# Patient Record
Sex: FEMALE | Race: WHITE | URBAN - METROPOLITAN AREA
[De-identification: names, ages, dates, MRNs, and addresses within clinical notes are randomized per-mention and may not be internally consistent; named-entity substitution may affect disease eponyms.]

---

## 2020-12-29 DIAGNOSIS — Z11.59 ENCOUNTER FOR SCREENING FOR OTHER VIRAL DISEASES: ICD-10-CM

## 2021-01-07 ASSESSMENT — MIFFLIN-ST. JEOR: SCORE: 1208.03

## 2021-01-10 DIAGNOSIS — Z11.59 ENCOUNTER FOR SCREENING FOR OTHER VIRAL DISEASES: ICD-10-CM

## 2021-01-10 LAB
SARS-COV-2 RNA RESP QL NAA+PROBE: NORMAL
SPECIMEN SOURCE: NORMAL

## 2021-01-10 PROCEDURE — U0003 INFECTIOUS AGENT DETECTION BY NUCLEIC ACID (DNA OR RNA); SEVERE ACUTE RESPIRATORY SYNDROME CORONAVIRUS 2 (SARS-COV-2) (CORONAVIRUS DISEASE [COVID-19]), AMPLIFIED PROBE TECHNIQUE, MAKING USE OF HIGH THROUGHPUT TECHNOLOGIES AS DESCRIBED BY CMS-2020-01-R: HCPCS | Performed by: PLASTIC SURGERY

## 2021-01-10 PROCEDURE — U0005 INFEC AGEN DETEC AMPLI PROBE: HCPCS | Performed by: PLASTIC SURGERY

## 2021-01-11 LAB
LABORATORY COMMENT REPORT: NORMAL
SARS-COV-2 RNA RESP QL NAA+PROBE: NEGATIVE
SPECIMEN SOURCE: NORMAL

## 2021-01-13 ENCOUNTER — ANESTHESIA EVENT (OUTPATIENT)
Dept: SURGERY | Facility: AMBULATORY SURGERY CENTER | Age: 31
End: 2021-01-13

## 2021-01-14 ENCOUNTER — ANESTHESIA (OUTPATIENT)
Dept: SURGERY | Facility: AMBULATORY SURGERY CENTER | Age: 31
End: 2021-01-14

## 2021-01-14 ENCOUNTER — HOSPITAL ENCOUNTER (OUTPATIENT)
Facility: AMBULATORY SURGERY CENTER | Age: 31
Discharge: HOME | End: 2021-01-14
Attending: PLASTIC SURGERY | Admitting: PLASTIC SURGERY

## 2021-01-14 VITALS
TEMPERATURE: 98.3 F | RESPIRATION RATE: 19 BRPM | OXYGEN SATURATION: 100 % | HEIGHT: 64 IN | HEART RATE: 70 BPM | WEIGHT: 112 LBS | BODY MASS INDEX: 19.12 KG/M2 | DIASTOLIC BLOOD PRESSURE: 82 MMHG | SYSTOLIC BLOOD PRESSURE: 106 MMHG

## 2021-01-14 DIAGNOSIS — R52 PAIN: Primary | ICD-10-CM

## 2021-01-14 DIAGNOSIS — B99.9 INFECTION: ICD-10-CM

## 2021-01-14 LAB — HCG UR QL: NEGATIVE

## 2021-01-14 PROCEDURE — L8600 IMPLANT BREAST SILICONE/EQ: HCPCS

## 2021-01-14 PROCEDURE — 360N000089 HC SURGERY LEVEL COSMETIC 120 MIN

## 2021-01-14 PROCEDURE — G8907 PT DOC NO EVENTS ON DISCHARG: HCPCS

## 2021-01-14 PROCEDURE — 81025 URINE PREGNANCY TEST: CPT | Performed by: ANESTHESIOLOGY

## 2021-01-14 PROCEDURE — G8916 PT W IV AB GIVEN ON TIME: HCPCS

## 2021-01-14 DEVICE — IMPLANTABLE DEVICE: Type: IMPLANTABLE DEVICE | Site: BREAST | Status: FUNCTIONAL

## 2021-01-14 RX ORDER — DIAZEPAM 10 MG
10 TABLET ORAL EVERY 12 HOURS PRN
Status: DISCONTINUED | OUTPATIENT
Start: 2021-01-14 | End: 2021-01-15 | Stop reason: HOSPADM

## 2021-01-14 RX ORDER — FENTANYL CITRATE 50 UG/ML
INJECTION, SOLUTION INTRAMUSCULAR; INTRAVENOUS PRN
Status: DISCONTINUED | OUTPATIENT
Start: 2021-01-14 | End: 2021-01-14

## 2021-01-14 RX ORDER — DEXAMETHASONE SODIUM PHOSPHATE 4 MG/ML
4 INJECTION, SOLUTION INTRA-ARTICULAR; INTRALESIONAL; INTRAMUSCULAR; INTRAVENOUS; SOFT TISSUE EVERY 10 MIN PRN
Status: DISCONTINUED | OUTPATIENT
Start: 2021-01-14 | End: 2021-01-15 | Stop reason: HOSPADM

## 2021-01-14 RX ORDER — FENTANYL CITRATE 50 UG/ML
25-50 INJECTION, SOLUTION INTRAMUSCULAR; INTRAVENOUS
Status: DISCONTINUED | OUTPATIENT
Start: 2021-01-14 | End: 2021-01-15 | Stop reason: HOSPADM

## 2021-01-14 RX ORDER — ACETAMINOPHEN 325 MG/1
975 TABLET ORAL ONCE
Status: COMPLETED | OUTPATIENT
Start: 2021-01-14 | End: 2021-01-14

## 2021-01-14 RX ORDER — KETOROLAC TROMETHAMINE 30 MG/ML
30 INJECTION, SOLUTION INTRAMUSCULAR; INTRAVENOUS EVERY 6 HOURS PRN
Status: DISCONTINUED | OUTPATIENT
Start: 2021-01-14 | End: 2021-01-15 | Stop reason: HOSPADM

## 2021-01-14 RX ORDER — HYDROXYZINE HYDROCHLORIDE 25 MG/1
25 TABLET, FILM COATED ORAL
Status: DISCONTINUED | OUTPATIENT
Start: 2021-01-14 | End: 2021-01-15 | Stop reason: HOSPADM

## 2021-01-14 RX ORDER — NALOXONE HYDROCHLORIDE 0.4 MG/ML
0.4 INJECTION, SOLUTION INTRAMUSCULAR; INTRAVENOUS; SUBCUTANEOUS
Status: DISCONTINUED | OUTPATIENT
Start: 2021-01-14 | End: 2021-01-15 | Stop reason: HOSPADM

## 2021-01-14 RX ORDER — PROPOFOL 10 MG/ML
INJECTION, EMULSION INTRAVENOUS PRN
Status: DISCONTINUED | OUTPATIENT
Start: 2021-01-14 | End: 2021-01-14

## 2021-01-14 RX ORDER — ONDANSETRON 4 MG/1
4 TABLET, ORALLY DISINTEGRATING ORAL EVERY 30 MIN PRN
Status: DISCONTINUED | OUTPATIENT
Start: 2021-01-14 | End: 2021-01-15 | Stop reason: HOSPADM

## 2021-01-14 RX ORDER — ONDANSETRON 2 MG/ML
INJECTION INTRAMUSCULAR; INTRAVENOUS PRN
Status: DISCONTINUED | OUTPATIENT
Start: 2021-01-14 | End: 2021-01-14

## 2021-01-14 RX ORDER — SODIUM CHLORIDE, SODIUM LACTATE, POTASSIUM CHLORIDE, CALCIUM CHLORIDE 600; 310; 30; 20 MG/100ML; MG/100ML; MG/100ML; MG/100ML
INJECTION, SOLUTION INTRAVENOUS CONTINUOUS
Status: DISCONTINUED | OUTPATIENT
Start: 2021-01-14 | End: 2021-01-15 | Stop reason: HOSPADM

## 2021-01-14 RX ORDER — MEPERIDINE HYDROCHLORIDE 25 MG/ML
12.5 INJECTION INTRAMUSCULAR; INTRAVENOUS; SUBCUTANEOUS
Status: DISCONTINUED | OUTPATIENT
Start: 2021-01-14 | End: 2021-01-15 | Stop reason: HOSPADM

## 2021-01-14 RX ORDER — CEFAZOLIN SODIUM 2 G/100ML
2 INJECTION, SOLUTION INTRAVENOUS
Status: COMPLETED | OUTPATIENT
Start: 2021-01-14 | End: 2021-01-14

## 2021-01-14 RX ORDER — ONDANSETRON 4 MG/1
4 TABLET, ORALLY DISINTEGRATING ORAL
Status: DISCONTINUED | OUTPATIENT
Start: 2021-01-14 | End: 2021-01-15 | Stop reason: HOSPADM

## 2021-01-14 RX ORDER — ALBUTEROL SULFATE 0.83 MG/ML
2.5 SOLUTION RESPIRATORY (INHALATION) EVERY 4 HOURS PRN
Status: DISCONTINUED | OUTPATIENT
Start: 2021-01-14 | End: 2021-01-15 | Stop reason: HOSPADM

## 2021-01-14 RX ORDER — ONDANSETRON 2 MG/ML
4 INJECTION INTRAMUSCULAR; INTRAVENOUS EVERY 30 MIN PRN
Status: DISCONTINUED | OUTPATIENT
Start: 2021-01-14 | End: 2021-01-15 | Stop reason: HOSPADM

## 2021-01-14 RX ORDER — HYDROXYZINE PAMOATE 25 MG/1
25 CAPSULE ORAL EVERY 6 HOURS PRN
Qty: 30 CAPSULE | Refills: 0
Start: 2021-01-14

## 2021-01-14 RX ORDER — OXYCODONE HYDROCHLORIDE 5 MG/1
5-10 TABLET ORAL EVERY 4 HOURS PRN
Status: DISCONTINUED | OUTPATIENT
Start: 2021-01-14 | End: 2021-01-15 | Stop reason: HOSPADM

## 2021-01-14 RX ORDER — ONDANSETRON 4 MG/1
8 TABLET, ORALLY DISINTEGRATING ORAL EVERY 6 HOURS PRN
Status: DISCONTINUED | OUTPATIENT
Start: 2021-01-14 | End: 2021-01-15 | Stop reason: HOSPADM

## 2021-01-14 RX ORDER — DEXAMETHASONE SODIUM PHOSPHATE 4 MG/ML
10 INJECTION, SOLUTION INTRA-ARTICULAR; INTRALESIONAL; INTRAMUSCULAR; INTRAVENOUS; SOFT TISSUE
Status: COMPLETED | OUTPATIENT
Start: 2021-01-14 | End: 2021-01-14

## 2021-01-14 RX ORDER — PROPOFOL 10 MG/ML
INJECTION, EMULSION INTRAVENOUS CONTINUOUS PRN
Status: DISCONTINUED | OUTPATIENT
Start: 2021-01-14 | End: 2021-01-14

## 2021-01-14 RX ORDER — LIDOCAINE HYDROCHLORIDE 20 MG/ML
INJECTION, SOLUTION INFILTRATION; PERINEURAL PRN
Status: DISCONTINUED | OUTPATIENT
Start: 2021-01-14 | End: 2021-01-14

## 2021-01-14 RX ORDER — OXYCODONE AND ACETAMINOPHEN 5; 325 MG/1; MG/1
2 TABLET ORAL
Status: DISCONTINUED | OUTPATIENT
Start: 2021-01-14 | End: 2021-01-15 | Stop reason: HOSPADM

## 2021-01-14 RX ORDER — LABETALOL HYDROCHLORIDE 5 MG/ML
10 INJECTION, SOLUTION INTRAVENOUS
Status: DISCONTINUED | OUTPATIENT
Start: 2021-01-14 | End: 2021-01-15 | Stop reason: HOSPADM

## 2021-01-14 RX ORDER — CEPHALEXIN 500 MG/1
500 CAPSULE ORAL 2 TIMES DAILY
Qty: 28 CAPSULE | Refills: 0
Start: 2021-01-14 | End: 2021-01-21

## 2021-01-14 RX ORDER — LIDOCAINE 40 MG/G
CREAM TOPICAL
Status: DISCONTINUED | OUTPATIENT
Start: 2021-01-14 | End: 2021-01-15 | Stop reason: HOSPADM

## 2021-01-14 RX ORDER — BUPIVACAINE HYDROCHLORIDE AND EPINEPHRINE 2.5; 5 MG/ML; UG/ML
INJECTION, SOLUTION INFILTRATION; PERINEURAL PRN
Status: DISCONTINUED | OUTPATIENT
Start: 2021-01-14 | End: 2021-01-14 | Stop reason: HOSPADM

## 2021-01-14 RX ORDER — OXYCODONE AND ACETAMINOPHEN 5; 325 MG/1; MG/1
1-2 TABLET ORAL EVERY 4 HOURS PRN
Qty: 20 TABLET | Refills: 0
Start: 2021-01-14

## 2021-01-14 RX ORDER — NALOXONE HYDROCHLORIDE 0.4 MG/ML
0.2 INJECTION, SOLUTION INTRAMUSCULAR; INTRAVENOUS; SUBCUTANEOUS
Status: DISCONTINUED | OUTPATIENT
Start: 2021-01-14 | End: 2021-01-15 | Stop reason: HOSPADM

## 2021-01-14 RX ADMIN — CEFAZOLIN SODIUM 2 G: 2 INJECTION, SOLUTION INTRAVENOUS at 10:22

## 2021-01-14 RX ADMIN — ONDANSETRON 4 MG: 2 INJECTION INTRAMUSCULAR; INTRAVENOUS at 10:37

## 2021-01-14 RX ADMIN — DEXAMETHASONE SODIUM PHOSPHATE 10 MG: 4 INJECTION, SOLUTION INTRA-ARTICULAR; INTRALESIONAL; INTRAMUSCULAR; INTRAVENOUS; SOFT TISSUE at 10:37

## 2021-01-14 RX ADMIN — LIDOCAINE HYDROCHLORIDE 60 MG: 20 INJECTION, SOLUTION INFILTRATION; PERINEURAL at 10:14

## 2021-01-14 RX ADMIN — PROPOFOL 200 MCG/KG/MIN: 10 INJECTION, EMULSION INTRAVENOUS at 10:15

## 2021-01-14 RX ADMIN — Medication 20 MG: at 10:54

## 2021-01-14 RX ADMIN — Medication 20 MG: at 10:34

## 2021-01-14 RX ADMIN — FENTANYL CITRATE 50 MCG: 50 INJECTION, SOLUTION INTRAMUSCULAR; INTRAVENOUS at 10:08

## 2021-01-14 RX ADMIN — SODIUM CHLORIDE, SODIUM LACTATE, POTASSIUM CHLORIDE, CALCIUM CHLORIDE: 600; 310; 30; 20 INJECTION, SOLUTION INTRAVENOUS at 09:43

## 2021-01-14 RX ADMIN — FENTANYL CITRATE 50 MCG: 50 INJECTION, SOLUTION INTRAMUSCULAR; INTRAVENOUS at 10:33

## 2021-01-14 RX ADMIN — PROPOFOL 100 MG: 10 INJECTION, EMULSION INTRAVENOUS at 10:32

## 2021-01-14 RX ADMIN — FENTANYL CITRATE 50 MCG: 50 INJECTION, SOLUTION INTRAMUSCULAR; INTRAVENOUS at 10:21

## 2021-01-14 RX ADMIN — PROPOFOL 200 MG: 10 INJECTION, EMULSION INTRAVENOUS at 10:14

## 2021-01-14 RX ADMIN — ACETAMINOPHEN 975 MG: 325 TABLET ORAL at 09:34

## 2021-01-14 ASSESSMENT — LIFESTYLE VARIABLES: TOBACCO_USE: 1

## 2021-01-14 NOTE — OP NOTE
Procedure Date: 01/14/2021      PREOPERATIVE DIAGNOSES:  Bilateral hypomastia.      POSTOPERATIVE DIAGNOSES:  Bilateral hypomastia.      PROCEDURES PERFORMED:  Bilateral augmentation mammoplasty through an inframammary fold incision with implant in a submuscular position.      IMPLANTS:   1.  Left breast implant:  Sientra HSC smooth, round, high profile 415 mL cohesive gel breast implant, reference number [id number], serial number [id number].      2.  Right breast implant:  Sientra 415 mL HSC smooth, round, high profile cohesive gel breast implant, reference number [id number], serial number is [id number].      SURGEON:  Guillermo Ramirez MD      ANESTHESIA:  General, using a laryngeal masked airway.      INDICATIONS FOR PROCEDURE:  The patient is a very pleasant and attractive 30-year-old female who has been to see us on several occasions for consultation regarding breast augmentation.  She understands that breast implants are not considered a lifetime medical device and she likely will have to have them replaced within her lifetime.  The current generation of Sientra HSC cohesive gel breast implants do carry a lifetime warranty.  The patient understands there is a phenomenon known as silent rupture, where the implant may fail and neither the surgeon nor patient are aware of this.  I recommend MRI surveillance for silent rupture at 8-10 years postoperatively.  The patient understands there is a phenomenon known as capsular contracture, where the body may form an aggressive scar capsule around the breast implant.  The rate of capsular contracture in my practice is between 1% and 2% when implant is placed in a submuscular position through an inframammary fold incision, as would be the case for this patient.  The patient was told there are 4 maneuvers I utilize to diminish her chances of capsular contracture.  The first is submuscular breast implant placement.  Submuscular breast implant placement confers a 3- to  4-fold decrease in rate of capsular contracture compared with subglandular breast augmentation.  Subglandular breast augmentation carries a capsular contracture rate in the 12% to 16% range and this can be lowered to the 3% to 4% range by placing the implant in a submuscular position.  Use of an inframammary fold incision for placement of the breast implant has been shown to confer a 10-fold reduction rate of capsular contracture compared with the use of a periareolar incision.  This was shown in clinical studies by Tucker Valdes MD from [facility].  In his study, Dr. Valdes showed that the rate of capsular contracture through an inframammary fold incision was 0.59%, whereas his capsular contracture rate was 9.5% when a periareolar incision was used.  Use of breast pocket irrigations consisting of Ancef, gentamicin and bacitracin has been shown to decrease the rate of capsular contracture in clinical studies performed by Tenzin Angel MD from the [facility].  Last maneuver, I utilize is the use of a Ferrer funnel for implant placement.  The Ferrer funnel allows for a no-touch technique, which theoretically should decrease the chance of developing a biofilm and therefore capsular contracture.  The patient understands that she should tell her mammographer that she has breast implants when age appropriate.  Special mammographic views known as Jamia views are used to best visualize the breasts following augmentation mammoplasty.      The patient was told there is a condition known as breast implant-associated lymphoma.  Approximately 400-500 cases of breast implant-associated lymphoma have been reported in the worldwide literature to date and all have been shown to occur in patients where the surgeon has chosen to use a textured implant.  In my practice dating to 1998, I have used only smooth implants in my practice.  No cases of breast implant-associated lymphoma have  occurred with use of smooth breast implants.  The patient understands that her incision will be in the inframammary fold.  She understands that her implant will be placed in a submuscular position.  She understands the risks of the operation include first and foremost the chance of capsular contracture, followed by implant malposition, asymmetry, hypertrophic scarring, and possible dissatisfaction with cosmetic outcome.  Complications such as bleeding and infection are extremely rare in my practice.  I have had 1 single postoperative bleed and 2 infections in my practice dating to 1994.  The patient has been given a chance to ask questions and all were answered.  The patient provides her informed consent for the procedure.      DESCRIPTION OF PROCEDURE AND FINDINGS:  In the preoperative holding area, the inframammary fold was marked and consent was obtained.  This consent is in addition to our in-office consent that she signed at her preoperative evaluation.  The patient was taken to the operating room, placed in supine position on the operating room table.  A successful general anesthetic was then induced using a laryngeal mask airway.  The patient received 2 grams of Ancef and 10 mg of Decadron intravenously prior to commencing with surgery.  The patient's chest was then prepped and draped in routine sterile fashion.  Nipple areolar complexes were covered with 3M Tegaderm dressings.  A timeout was called at 11:29 a.m.  Incision was made at 11:30 a.m.  A 4 cm incision was made in the right inframammary fold and through this incision dissection was carried down to the pectoralis major muscle.  I then dissected several centimeters above the inframammary fold on the clavipectoral fascia.  Then, over a rib and utilizing a Arnaldo retractor, I established a submuscular plane.  The muscle was partially released from 3 o'clock to 6 o'clock on the right side.  Intercostal perforating vessels were cauterized using an insulated  bayonet style DeBakey type monopolar forceps as I came upon them.  Finger dissection was used laterally to avoid any traction of the fourth intercostal nerve.  Superiorly, dissection was made between the pectoralis major and pectoralis minor muscles using a uterine sound in gentle sweeping fashion.  A sizer was placed and filled with air, which gave nice shape to her breast and also identified areas where further attenuation of the medial fibers of pectoralis major was needed and this was accomplished surgically.  The right breast pocket was then injected with 0.25% Marcaine with Kenalog for postoperative analgesia and for the anti-inflammatory properties of the steroids.  Right breast pocket was then irrigated with solution consisting of 1 gram of Ancef, 80 mg of gentamicin, and bacitracin in normal saline.  This was done in accordance with studies by Tenzin Angel MD, from the [facility] as a clinically proven means of diminishing the chance of capsular contracture.      Attention was then directed to the left side, where the identical operation was performed.  On the left side, the muscle was partially released from 6 o'clock to 9 o'clock.  Again, a sizer was placed and filled with air, which gave nice shape to her breast.  This also identified areas were further attenuation of the medial fibers of pectoralis major was needed and this was accomplished surgically.  I then injected the dissection pocket with 0.25% Marcaine and Kenalog for postoperative analgesia and for the anti-inflammatory properties of the steroids.  The Ancef, gentamicin and bacitracin solution was then irrigated into left breast pocket as well.        Sientra 415 mL HSC smooth, round, high profile, cohesive gel breast implants were chosen.  Their reference numbers and serial numbers can be found at the beginning of this operative report.  They were opened on the back table.  The Ancef, gentamicin and bacitracin solution  was then irrigated into the breast pockets.  At this point, all operative personnel changed their gloves.  The implants were soaked in the Ancef, gentamicin and bacitracin solution.  A Ferrer funnel was opened.  I lubricated the funnel with the Ancef, gentamicin and bacitracin solution.  The implant was then transferred from its sterile container to the Ferrer funnel on the back table.  The implant was then inserted into the right breast pocket utilizing a no-touch technique.  Closure consisted of 2-0 and 3-0 Vicryl sutures in the deep muscular layer.  The 3-0 sutures were used laterally, the 2-0 sutures used medially.  The deep subcutaneous layer was closed using 4-0 PDS sutures in buried interrupted fashion.  Deep dermal sutures were then placed using 4-0 Monocryl suture in buried and interrupted fashion.  The skin was then run using 4-0 Prolene suture in running and buried continuous intracuticular fashion, 6-0 Prolene sutures were placed at each end of the suture for exact coaptation of the skin.      Attention was then directed to the left side, where the identical operation was performed.  Once again, all operative personnel changed their gloves.  The Ferrer funnel was then lubricated using the antibiotic solution.  The Sientra implant was then transferred from its sterile container to the Ferrer funnel.  The Ferrer funnel was then utilized to place the implant utilizing a no-touch technique.  Closure was identical from left to right.  Dressing consisted of Mastisol and 3M half-inch Steri-Strips over the inframammary fold incisions followed by a surgical bra.  It should be noted that I injected lateral to the breast and inferior to the breast with 0.25% Marcaine with 1:200,000 epinephrine.      ESTIMATED BLOOD LOSS:  Minimal.      COMPLICATIONS:  None.      CONDITION:  Stable to postanesthesia recovery.      OPERATIVE TIMES:  In-room time:  10:11 a.m.  Timeout called:  10:29 a.m.  Start of operation:  10:30  a.m.  End of procedure:  11:48 a.m.  Out of room time:  11:56 a.m.      ACTUAL PAID SURGICAL OPERATIVE TIME:  2 hours, which equals 2 hours of paid surgical operative time plus 30 minutes of anesthesia/OR time.  Actual surgical operative time was 1 hour and 28 minutes, or 32 minutes ahead of paid surgical operative time.         AMILCAR DELGDAO MD             D: 2021   T: 2021   MT: OWEN      Name:     RHYS DORADO   MRN:      [id number]        Account:        [id number]   :      1990           Procedure Date: 2021      Document: [id number]

## 2021-01-14 NOTE — ANESTHESIA PROCEDURE NOTES
Airway    Patient location during procedure: OR    Staff -   CRNA: Juaquin Bullock APRN CRNA  Performed By: CRNA    Consent for Airway   Urgency: elective    Indications and Patient Condition  Indications for airway management: omkar-procedural  Induction type:intravenousMask difficulty assessment: 1 - vent by mask    Final Airway Details  Final airway type: supraglottic airway    Endotracheal Airway Details   Secured with: plastic tape    Post intubation assessment   Placement verified by: capnometry, equal breath sounds and chest rise   Number of attempts at approach: 1  Number of other approaches attempted: 0  Secured with:plastic tape  Ease of procedure: easy  Dentition: Intact and Unchanged

## 2021-01-14 NOTE — ANESTHESIA POSTPROCEDURE EVALUATION
Anesthesia POST Procedure Evaluation    Patient: Marita Dwyer   MRN:     [id number] Gender:   female   Age:    30 year old :      1990        Preoperative Diagnosis: Encounter for cosmetic surgery [Z41.1]   Procedure(s):  Bilateral breast augmentation   Postop Comments: No value filed.     Anesthesia Type: General       Disposition: Outpatient   Postop Pain Control: Uneventful            Sign Out: Well controlled pain   PONV: No   Neuro/Psych: Uneventful            Sign Out: Acceptable/Baseline neuro status   Airway/Respiratory: Uneventful            Sign Out: AIRWAY IN SITU/Resp. Support   CV/Hemodynamics: Uneventful            Sign Out: Acceptable CV status   Other NRE: NONE   DID A NON-ROUTINE EVENT OCCUR? No         Last Anesthesia Record Vitals:  CRNA VITALS  2021 1129 - 2021 1229      2021             Pulse:  111    SpO2:  100 %    Resp Rate (observed):  9          Last PACU Vitals:  Vitals Value Taken Time   /69 21 1230   Temp 98.3  F (36.8  C) 21 1230   Pulse 67 21 1230   Resp 16 21 1230   SpO2 100 % 21 1230   Temp src     NIBP     Pulse     SpO2     Resp     Temp     Ht Rate     Temp 2           Electronically Signed By: Johnny Frost MD, 2021, 3:29 PM

## 2021-01-14 NOTE — ANESTHESIA CARE TRANSFER NOTE
Patient: Marita Dwyer    Procedure(s):  Bilateral breast augmentation    Diagnosis: Encounter for cosmetic surgery [Z41.1]  Diagnosis Additional Information: No value filed.    Anesthesia Type:   General     Note:  Airway :Face Mask  Patient transferred to:PACU  Handoff Report: Identifed the Patient, Identified the Reponsible Provider, Reviewed the pertinent medical history, Discussed the surgical course, Reviewed Intra-OP anesthesia mangement and issues during anesthesia, Set expectations for post-procedure period and Allowed opportunity for questions and acknowledgement of understanding      Vitals: (Last set prior to Anesthesia Care Transfer)    CRNA VITALS  1/14/2021 1129 - 1/14/2021 1217      1/14/2021             Pulse:  111    SpO2:  100 %    Resp Rate (observed):  9                Electronically Signed By: SANA Cook CRNA  January 14, 2021  12:17 PM

## 2021-01-14 NOTE — BRIEF OP NOTE
[facility] Brief Operative Note    Pre-operative diagnosis: Hypomastia   Post-operative diagnosis same   Procedure: Procedure(s):  Bilateral breast augmentation   Surgeon(s): Surgeon(s) and Role:     * Guillermo Ramirez MD - Primary   Estimated blood loss:  minimal    Specimens: none   Findings: none   Assist: none  Complications: none  Condition: extubated and stable to PAR    Guillermo Ramirez MD

## 2021-01-14 NOTE — OR NURSING
Removed umbilicus ring  after induction of anesthesia for cautery safety .  Sent with patient to PACU

## 2021-01-14 NOTE — DISCHARGE INSTRUCTIONS
[facility]   Adult Discharge Orders & Instructions     For 24 hours after surgery    1. Get plenty of rest.  A responsible adult must stay with you for at least 24 hours after you leave the hospital.   2. Do not drive or use heavy equipment.  If you have weakness or tingling, don't drive or use heavy equipment until this feeling goes away.  3. Do not drink alcohol.  4. Avoid strenuous or risky activities.  Ask for help when climbing stairs.   5. You may feel lightheaded.  IF so, sit for a few minutes before standing.  Have someone help you get up.   6. If you have nausea (feel sick to your stomach): Drink only clear liquids such as apple juice, ginger ale, broth or 7-Up.  Rest may also help.  Be sure to drink enough fluids.  Move to a regular diet as you feel able.  7. You may have a slight fever. Call the doctor if your fever is over 100 F (37.7 C) (taken under the tongue) or lasts longer than 24 hours.  8. You may have a dry mouth, a sore throat, muscle aches or trouble sleeping.  These should go away after 24 hours.  9. Do not make important or legal decisions.     Call your doctor for any of the followin.  Signs of infection (fever, growing tenderness at the surgery site, a large amount of drainage or bleeding, severe pain, foul-smelling drainage, redness, swelling).    2. It has been over 8 to 10 hours since surgery and you are still not able to urinate (pass water).    3.  Headache for over 24 hours.    4.  Numbness, tingling or weakness the day after surgery (if you had spinal anesthesia).                  5. Signs of Covid-19 infection (temperature over 100 degrees, shortness of breath, cough, loss of taste/smell, generalized body aches, persistent headache,                  chills, sore throat, nausea/vomiting/diarrhea).    To contact Dr Sy call:    [phone] - Day  [phone] - After hours pager

## 2021-01-14 NOTE — ANESTHESIA PREPROCEDURE EVALUATION
"Anesthesia Pre-Procedure Evaluation    Patient: Marita Dwyer   MRN:     [id number] Gender:   female   Age:    30 year old :      1990        Preoperative Diagnosis: Encounter for cosmetic surgery [Z41.1]   Procedure(s):  Bilateral breast augmentation     LABS:  CBC: No results found for: WBC, HGB, HCT, PLT  BMP: No results found for: NA, POTASSIUM, CHLORIDE, CO2, BUN, CR, GLC  COAGS: No results found for: PTT, INR, FIBR  POC: No results found for: BGM, HCG, HCGS  OTHER: No results found for: PH, LACT, A1C, ANTHONY, PHOS, MAG, ALBUMIN, PROTTOTAL, ALT, AST, GGT, ALKPHOS, BILITOTAL, BILIDIRECT, LIPASE, AMYLASE, YVONNE, TSH, T4, T3, CRP, SED     Preop Vitals    BP Readings from Last 3 Encounters:   21 113/70    Pulse Readings from Last 3 Encounters:   No data found for Pulse      Resp Readings from Last 3 Encounters:   21 16    SpO2 Readings from Last 3 Encounters:   21 98%      Temp Readings from Last 1 Encounters:   21 97.5  F (36.4  C) (Temporal)    Ht Readings from Last 1 Encounters:   21 1.626 m (5' 4\")      Wt Readings from Last 1 Encounters:   21 50.8 kg (112 lb)    Estimated body mass index is 19.22 kg/m  as calculated from the following:    Height as of this encounter: 1.626 m (5' 4\").    Weight as of this encounter: 50.8 kg (112 lb).     LDA:        History reviewed. No pertinent past medical history.   History reviewed. No pertinent surgical history.   No Known Allergies     Anesthesia Evaluation     . Pt has had prior anesthetic. Type: General and MAC    No history of anesthetic complications          ROS/MED HX    ENT/Pulmonary: Comment: Quit smoking 6 weeks ago.  She uses marijuana regularly, and used it at 5:30 AM today.    (+)tobacco use, Past use , . .    Neurologic:  - neg neurologic ROS     Cardiovascular:  - neg cardiovascular ROS       METS/Exercise Tolerance:     Hematologic:  - neg hematologic  ROS       Musculoskeletal:  - neg musculoskeletal ROS     "   GI/Hepatic:  - neg GI/hepatic ROS       Renal/Genitourinary:  - ROS Renal section negative       Endo:  - neg endo ROS       Psychiatric:  - neg psychiatric ROS       Infectious Disease:  - neg infectious disease ROS       Malignancy:      - no malignancy   Other:    - neg other ROS                     PHYSICAL EXAM:   Mental Status/Neuro: A/A/O   Airway: Facies: Feasible  Mallampati: I  Mouth/Opening: Full  TM distance: > 6 cm  Neck ROM: Full   Respiratory: Auscultation: CTAB     Resp. Rate: Normal     Resp. Effort: Normal      CV: Rhythm: Regular  Rate: Age appropriate  Heart: Normal Sounds  Edema: None   Comments:      Dental: Normal Dentition                Assessment:   ASA SCORE: 1    H&P: History and physical reviewed and following examination; no interval change.   Smoking Status:  Non-Smoker/Unknown   NPO Status: NPO Appropriate     Plan:   Anes. Type:  General   Pre-Medication: None   Induction:  IV (Standard)   Airway: LMA   Access/Monitoring: PIV   Maintenance: TIVA     Postop Plan:   Postop Pain: Opioids  Postop Sedation/Airway: Not planned  Disposition: Outpatient     PONV Management:   Adult Risk Factors: Female, Non-Smoker, Postop Opioids   Prevention: Ondansetron, Dexamethasone, No Volatiles     CONSENT: Direct conversation   Plan and risks discussed with: Patient   Blood Products: Consent Deferred (Minimal Blood Loss)                   Johnny Frost MD

## (undated) DEVICE — GLOVE PROTEXIS W/NEU-THERA 6.5  2D73TE65

## (undated) DEVICE — PACK MINOR SBA15MIFSE

## (undated) DEVICE — GLOVE PROTEXIS BLUE W/NEU-THERA 7.0  2D73EB70

## (undated) DEVICE — DRSG ABDOMINAL 07 1/2X8" 7197D

## (undated) DEVICE — DRSG KERLIX FLUFFS X5

## (undated) DEVICE — GLOVE PROTEXIS W/NEU-THERA 7.5  2D73TE75

## (undated) DEVICE — SUCTION CANISTER MEDIVAC LINER 1500ML W/LID 65651-515

## (undated) DEVICE — DRSG TEGADERM 2 3/8X2 3/4" 1624W

## (undated) DEVICE — ADH LIQUID MASTISOL TOPICAL VIAL 2-3ML 0523-48

## (undated) DEVICE — SYR BULB IRRIG DOVER 60 ML LATEX FREE 67000

## (undated) DEVICE — SYR 50ML LL W/O NDL 309653

## (undated) DEVICE — SU VICRYL 3-0 SH 27" J316H

## (undated) DEVICE — NDL 19GA 1.5"

## (undated) DEVICE — SOL WATER IRRIG 1000ML BOTTLE 07139-09

## (undated) DEVICE — PREP CHLORAPREP 26ML TINTED ORANGE  260815

## (undated) DEVICE — DECANTER TRANSFER DEVICE 2008S

## (undated) DEVICE — NDL SPINAL 25GA 3.5" QUINCKE 405180

## (undated) DEVICE — DRSG STERI STRIP 1/2X4" R1547

## (undated) DEVICE — STPL SKIN 35W 054887

## (undated) DEVICE — SUCTION TIP YANKAUER W/O VENT K86

## (undated) RX ORDER — FENTANYL CITRATE 50 UG/ML
INJECTION, SOLUTION INTRAMUSCULAR; INTRAVENOUS
Status: DISPENSED
Start: 2021-01-14

## (undated) RX ORDER — ACETAMINOPHEN 325 MG/1
TABLET ORAL
Status: DISPENSED
Start: 2021-01-14

## (undated) RX ORDER — FENTANYL CITRATE-0.9 % NACL/PF 10 MCG/ML
PLASTIC BAG, INJECTION (ML) INTRAVENOUS
Status: DISPENSED
Start: 2021-01-14

## (undated) RX ORDER — ONDANSETRON 2 MG/ML
INJECTION INTRAMUSCULAR; INTRAVENOUS
Status: DISPENSED
Start: 2021-01-14